# Patient Record
Sex: FEMALE | Race: OTHER | NOT HISPANIC OR LATINO | URBAN - METROPOLITAN AREA
[De-identification: names, ages, dates, MRNs, and addresses within clinical notes are randomized per-mention and may not be internally consistent; named-entity substitution may affect disease eponyms.]

---

## 2024-09-19 PROBLEM — Z41.9 ENCOUNTER FOR PROCEDURE FOR PURPOSES OTHER THAN REMEDYING HEALTH STATE, UNSPECIFIED: Status: ACTIVE | Noted: 2024-09-19

## 2024-10-17 ENCOUNTER — APPOINTMENT (RX ONLY)
Dept: URBAN - METROPOLITAN AREA CLINIC 173 | Facility: CLINIC | Age: 29
Setting detail: DERMATOLOGY
End: 2024-10-17

## 2024-10-17 DIAGNOSIS — Z41.9 ENCOUNTER FOR PROCEDURE FOR PURPOSES OTHER THAN REMEDYING HEALTH STATE, UNSPECIFIED: ICD-10-CM

## 2024-10-17 DIAGNOSIS — B00.1 HERPESVIRAL VESICULAR DERMATITIS: ICD-10-CM

## 2024-10-17 PROCEDURE — ? FILLERS

## 2024-10-17 PROCEDURE — ? VIVACE

## 2024-10-17 PROCEDURE — ? COUNSELING

## 2024-10-17 PROCEDURE — ? PRESCRIPTION

## 2024-10-17 RX ORDER — VALACYCLOVIR HYDROCHLORIDE 500 MG/1
TABLET, FILM COATED ORAL BID
Qty: 10 | Refills: 6 | Status: ERX | COMMUNITY
Start: 2024-10-17

## 2024-10-17 RX ADMIN — VALACYCLOVIR HYDROCHLORIDE: 500 TABLET, FILM COATED ORAL at 00:00

## 2024-10-17 NOTE — PROCEDURE: VIVACE
Consent: Written consent obtained, risks reviewed including but not limited to pain, scarring, infection and incomplete improvement.  Patient understands the procedure is cosmetic in nature and will require out of pocket payment.
Depth In Mm: 0.1
Pain Scale: 0
Treatment Number (Optional): 1
Rf Time In Msec (Optional): 600
Post-Care Instructions: After the procedure, take precautions agains sun exposure. Do not apply sunscreen for 12 hours after the procedure. Do not apply make-up for 12 hours after the procedure. Avoid alcohol based toners for 10-14 days. After 2-3 days patients can return to their regular skin regimen.
Detail Level: Zone
Location #1: lower face
Depth In Mm: 1.5
Price (Use Numbers Only, No Special Characters Or $): 1000
Pre-Procedure Text: After consent was obtained the treatment areas were treated using the above parameters.

## 2024-10-17 NOTE — PROCEDURE: FILLERS
Additional Area 5 Volume In Cc: 0
Include Cannula Information In Note?: No
Additional Area 1 Location: Ear Lobes
Aspiration Statement: Aspiration was performed prior to injecting site with filler.
Lot #: [id number] *MATTHIEU Special*
Consent: Written consent obtained. Risks include but not limited to bruising, beading, irregular texture, ulceration, infection, allergic reaction, scar formation, incomplete augmentation, temporary nature, procedural pain.
Expiration Date (Month Year): 10/23/26
Post-Care Instructions: Patient instructed to apply Alastin post injection serum
Lot #: [id number]
Expiration Date (Month Year): 11/30/2022
Topical Anesthesia?: 23% lidocaine, 7% tetracaine
Additional Area 1 Location: lips
Lot #: [id number]
Detail Level: Detailed
Additional Area 1 Volume In Cc: 0.5
Price (Use Numbers Only, No Special Characters Or $): 593
Filler: RHA 2
Include Cannula Size?: 27G
Map Statment: See Attach Map for Complete Details
Expiration Date (Month Year): 01/31/22
Include Cannula Length?: 1.5 inch

## 2024-11-25 ENCOUNTER — APPOINTMENT (RX ONLY)
Dept: URBAN - METROPOLITAN AREA CLINIC 173 | Facility: CLINIC | Age: 29
Setting detail: DERMATOLOGY
End: 2024-11-25

## 2024-11-25 DIAGNOSIS — Z41.9 ENCOUNTER FOR PROCEDURE FOR PURPOSES OTHER THAN REMEDYING HEALTH STATE, UNSPECIFIED: ICD-10-CM

## 2024-11-25 PROCEDURE — ? VIVACE

## 2024-11-25 NOTE — PROCEDURE: VIVACE
Consent: Written consent obtained, risks reviewed including but not limited to pain, scarring, infection and incomplete improvement.  Patient understands the procedure is cosmetic in nature and will require out of pocket payment.
Depth In Mm: 0.1
Pain Scale: 0
Treatment Number (Optional): 3
Rf Time In Msec (Optional): 600
Post-Care Instructions: After the procedure, take precautions agains sun exposure. Do not apply sunscreen for 12 hours after the procedure. Do not apply make-up for 12 hours after the procedure. Avoid alcohol based toners for 10-14 days. After 2-3 days patients can return to their regular skin regimen.
Detail Level: Zone
Location #1: lower face
Location #2: upper face
Depth In Mm: 1.5
Price (Use Numbers Only, No Special Characters Or $): 1000
Pre-Procedure Text: After consent was obtained the treatment areas were treated using the above parameters.

## 2024-12-30 ENCOUNTER — APPOINTMENT (OUTPATIENT)
Dept: URBAN - METROPOLITAN AREA CLINIC 173 | Facility: CLINIC | Age: 29
Setting detail: DERMATOLOGY
End: 2024-12-30

## 2024-12-30 DIAGNOSIS — Z41.9 ENCOUNTER FOR PROCEDURE FOR PURPOSES OTHER THAN REMEDYING HEALTH STATE, UNSPECIFIED: ICD-10-CM

## 2024-12-30 PROCEDURE — ? BOTOX

## 2024-12-30 NOTE — PROCEDURE: BOTOX
Anterior Platysmal Bands Units: 0
Show Mentalis Units: No
Lot #: [id number]
Additional Area 3 Units: 30
Periorbital Skin Units: 12
Show Topical Anesthesia: Yes
Incrementing Botox Units: By 0.5 Units
Dilution (U/0.1 Cc): 5
Additional Area 2 Location: Lips
Additional Area 2 Units: 4
Additional Area 6 Location: Neck
Expiration Date (Month Year): 03/2027
Detail Level: Detailed
Additional Area 6 Units: 28
Glabellar Complex Units: 11
Price (Use Numbers Only, No Special Characters Or $): 9329
Additional Area 5 Location: Chin
Depressor Anguli Oris Units: 6
Additional Area 1 Location: Jelly Roll
Additional Area 1 Units: 2
Consent: Written consent obtained. Risks include but not limited to lid/brow ptosis, bruising, swelling, diplopia, temporary effect, incomplete chemical denervation.
Post-Care Instructions: Patient instructed to not lie down for 4 hours and limit physical activity for 24 hours. Patient instructed not to travel by airplane for 48 hours.
Additional Area 3 Location: Masseters

## 2025-03-25 ENCOUNTER — APPOINTMENT (OUTPATIENT)
Dept: URBAN - METROPOLITAN AREA CLINIC 173 | Facility: CLINIC | Age: 30
Setting detail: DERMATOLOGY
End: 2025-03-25

## 2025-03-25 ENCOUNTER — RX ONLY (RX ONLY)
Age: 30
End: 2025-03-25

## 2025-03-25 DIAGNOSIS — Z41.9 ENCOUNTER FOR PROCEDURE FOR PURPOSES OTHER THAN REMEDYING HEALTH STATE, UNSPECIFIED: ICD-10-CM

## 2025-03-25 PROCEDURE — ? BOTOX

## 2025-03-25 RX ORDER — HYDROQUINONE 4 %
CREAM (GRAM) TOPICAL
Qty: 30 | Refills: 0 | Status: ERX | COMMUNITY
Start: 2025-03-25

## 2025-03-25 NOTE — PROCEDURE: BOTOX
Anterior Platysmal Bands Units: 0
Show Mentalis Units: No
Lot #: [id number]
Additional Area 3 Units: 30
Periorbital Skin Units: 12
Show Topical Anesthesia: Yes
Incrementing Botox Units: By 0.5 Units
Dilution (U/0.1 Cc): 5
Additional Area 2 Location: Lips
Additional Area 2 Units: 4
Additional Area 6 Location: Neck
Expiration Date (Month Year): 03/2027
Detail Level: Detailed
Additional Area 6 Units: 28
Glabellar Complex Units: 11
Price (Use Numbers Only, No Special Characters Or $): 9250
Additional Area 5 Location: Chin
Depressor Anguli Oris Units: 6
Additional Area 1 Location: Jelly Roll
Additional Area 1 Units: 2
Consent: Written consent obtained. Risks include but not limited to lid/brow ptosis, bruising, swelling, diplopia, temporary effect, incomplete chemical denervation.
Post-Care Instructions: Patient instructed to not lie down for 4 hours and limit physical activity for 24 hours. Patient instructed not to travel by airplane for 48 hours.
Additional Area 3 Location: Masseters

## 2025-05-20 ENCOUNTER — APPOINTMENT (OUTPATIENT)
Dept: URBAN - METROPOLITAN AREA CLINIC 173 | Facility: CLINIC | Age: 30
Setting detail: DERMATOLOGY
End: 2025-05-20

## 2025-05-20 DIAGNOSIS — Z41.9 ENCOUNTER FOR PROCEDURE FOR PURPOSES OTHER THAN REMEDYING HEALTH STATE, UNSPECIFIED: ICD-10-CM

## 2025-05-20 PROCEDURE — ? COSMETIC CONSULTATION: BROWN SPOTS

## 2025-05-20 PROCEDURE — ? VIVACE

## 2025-05-20 NOTE — PROCEDURE: VIVACE
Depth In Mm: 0.1
Fluence (Optional): 245
Rf Time In Msec (Optional): 600
Shots (Optional): 816
Pain Scale: 0
Location #2: lower face, neck
Post-Care Instructions: After the procedure, take precautions agains sun exposure. Do not apply sunscreen for 12 hours after the procedure. Do not apply make-up for 12 hours after the procedure. Avoid alcohol based toners for 10-14 days. After 2-3 days patients can return to their regular skin regimen.
Consent: Written consent obtained, risks reviewed including but not limited to pain, scarring, infection and incomplete improvement.  Patient understands the procedure is cosmetic in nature and will require out of pocket payment.
Frequency (Mhz) (Optional): 1
Detail Level: Zone
Location #1: upper face, eyelids
Depth In Mm: 1.5
Pre-Procedure Text: After consent was obtained the treatment areas were treated using the above parameters.
Price (Use Numbers Only, No Special Characters Or $): 1500
Pain Scale: 4
Depth In Mm: 0.5
Fluence (Optional): 182
Shots (Optional): 601

## 2025-06-13 ENCOUNTER — APPOINTMENT (OUTPATIENT)
Dept: URBAN - METROPOLITAN AREA CLINIC 173 | Facility: CLINIC | Age: 30
Setting detail: DERMATOLOGY
End: 2025-06-13

## 2025-06-13 DIAGNOSIS — Z41.9 ENCOUNTER FOR PROCEDURE FOR PURPOSES OTHER THAN REMEDYING HEALTH STATE, UNSPECIFIED: ICD-10-CM

## 2025-06-13 PROCEDURE — ? CLEAR AND BRILLIANT LASER

## 2025-06-13 PROCEDURE — ? LASER HAIR REMOVAL

## 2025-06-13 ASSESSMENT — LOCATION SIMPLE DESCRIPTION DERM
LOCATION SIMPLE: LEFT FOREARM
LOCATION SIMPLE: RIGHT HAND
LOCATION SIMPLE: LEFT AXILLARY VAULT
LOCATION SIMPLE: RIGHT AXILLARY VAULT
LOCATION SIMPLE: RIGHT UPPER ARM
LOCATION SIMPLE: LEFT HAND
LOCATION SIMPLE: RIGHT POSTERIOR UPPER ARM
LOCATION SIMPLE: RIGHT FOREARM
LOCATION SIMPLE: LEFT UPPER ARM
LOCATION SIMPLE: LEFT POSTERIOR UPPER ARM
LOCATION SIMPLE: LEFT CHEEK

## 2025-06-13 ASSESSMENT — LOCATION DETAILED DESCRIPTION DERM
LOCATION DETAILED: LEFT ANTERIOR DISTAL UPPER ARM
LOCATION DETAILED: LEFT AXILLARY VAULT
LOCATION DETAILED: RIGHT VENTRAL PROXIMAL FOREARM
LOCATION DETAILED: LEFT PROXIMAL POSTERIOR UPPER ARM
LOCATION DETAILED: RIGHT ANTERIOR PROXIMAL UPPER ARM
LOCATION DETAILED: RIGHT RADIAL DORSAL HAND
LOCATION DETAILED: LEFT PROXIMAL DORSAL FOREARM
LOCATION DETAILED: LEFT RADIAL DORSAL HAND
LOCATION DETAILED: RIGHT PROXIMAL DORSAL FOREARM
LOCATION DETAILED: RIGHT AXILLARY VAULT
LOCATION DETAILED: RIGHT PROXIMAL POSTERIOR UPPER ARM
LOCATION DETAILED: LEFT VENTRAL DISTAL FOREARM
LOCATION DETAILED: LEFT MEDIAL MALAR CHEEK

## 2025-06-13 ASSESSMENT — LOCATION ZONE DERM
LOCATION ZONE: FACE
LOCATION ZONE: ARM
LOCATION ZONE: AXILLAE
LOCATION ZONE: HAND

## 2025-06-13 NOTE — PROCEDURE: LASER HAIR REMOVAL
Spot Size: 18 mm
Total Pulses (Settings #3): 439
Number Of Prepaid Treatments (Will Not Render If 0): 0
Location Override: Full arms/hands
External Cooling Fan Speed: 9
Cooling: chill tip
Post-Procedure Care: Immediate endpoint: mild perifollicular erythema and mild edema. Clobetasol and spf applied. Post care reviewed with patient.
Render Post-Care In The Note: No
Fluence (Will Not Render If 0): 11
Topical Anesthesia Type: 30% lidocaine
Price (Use Numbers Only, No Special Characters Or $): 942
Total Pulses: 107
Laser Type: Nd:Yag 1064nm
Fluence (Will Not Render If 0): 20
Detail Level: Zone
Pulse Duration (Include Units): 3
Pulse Duration (Include Units): 30
Fluence (Will Not Render If 0): 14
Length Of Topical Anesthesia Application (Optional): 30 minutes
Shaving (Optional): The patient was shaved in the office prior to the procedure
Consent: Written consent obtained, risks reviewed including but not limited to crusting, scabbing, blistering, scarring, darker or lighter pigmentary change, paradoxical hair regrowth, incomplete removal of hair and infection.
Total Pulses (Settings #4): 601
Treatment Number: 1
Post-Care Instructions: I reviewed with the patient in detail post-care instructions. Patient should avoid sun for a minimum of 4 weeks before and after treatment.
Pre-Procedure: Prior to proceeding the treatment areas were cleaned and all present put on their eye protection.
Total Pulses: 303
Eye Shield Text: Given the treatment area eye shields were inserted prior to treatment.

## 2025-06-13 NOTE — PROCEDURE: CLEAR AND BRILLIANT LASER
Energy Level: Medium
Topical Anesthesia?: 30% lidocaine
Length Of Topical Anesthesia Application (Optional): 45 minutes
Consent: Written consent obtained, risks reviewed including but not limited to crusting, scabbing, blistering, scarring, darker or lighter pigmentary change, incidental hair removal, bruising, and/or incomplete removal.
Post-Care Instructions: I reviewed with the patient in detail post-care instructions. Patient should stay away from the sun and wear sun protection until treated areas are fully healed.
Laser Type: Clear and Brilliant Permea 1927nm
Passes (Will Not Render If 0): 8
Energy Level: High
Detail Level: Zone
Post-Procedure Care: Nectar, Medical Barrier, spf, and cold gauze applied. Post care reviewed with patient.
Price (Use Numbers Only, No Special Characters Or $): 300

## 2025-07-10 ENCOUNTER — APPOINTMENT (OUTPATIENT)
Dept: URBAN - METROPOLITAN AREA CLINIC 173 | Facility: CLINIC | Age: 30
Setting detail: DERMATOLOGY
End: 2025-07-10

## 2025-07-10 DIAGNOSIS — Z41.9 ENCOUNTER FOR PROCEDURE FOR PURPOSES OTHER THAN REMEDYING HEALTH STATE, UNSPECIFIED: ICD-10-CM

## 2025-07-10 PROCEDURE — ? COSMETIC CONSULTATION: FILLERS

## 2025-07-10 PROCEDURE — ? BOTOX

## 2025-07-10 NOTE — HPI: COSMETIC FOLLOW UP
What Condition Are We Treating?: Follow up s/p laser hair removal for bilateral arms, axillae, and hands.
What Procedure Did We Perform At The Last Visit?: Laser hair removal

## 2025-07-10 NOTE — PROCEDURE: BOTOX
Nasal Root Units: 0
Show Right And Left Pupillary Line Units: No
Show Additional Area 4: Yes
Additional Area 2 Units: 6
Glabellar Complex Units: 11
Additional Area 4 Location: Jelly rolls
Expiration Date (Month Year): 6/27
Additional Area 1 Location: lips
Incrementing Botox Units: By 0.5 Units
Additional Area 4 Units: 2
Additional Area 5 Location: Trapezius
Price (Use Numbers Only, No Special Characters Or $): 1397
Additional Area 1 Units: 4
Consent: Written consent obtained. Risks include but not limited to lid/brow ptosis, bruising, swelling, diplopia, temporary effect, incomplete chemical denervation.
Additional Area 5 Units: 60
Periorbital Skin Units: 12
Masseter Units: 15
Post-Care Instructions: Patient instructed to not lie down for 4 hours and limit physical activity for 24 hours. Patient instructed not to travel by airplane for 48 hours.
Additional Area 3 Location: neck
Detail Level: Detailed
Additional Area 3 Units: 28
Lot #: [id number]
Dilution (U/0.1 Cc): 5
Additional Area 2 Location: [facility]

## 2025-07-30 ENCOUNTER — APPOINTMENT (OUTPATIENT)
Dept: URBAN - METROPOLITAN AREA CLINIC 173 | Facility: CLINIC | Age: 30
Setting detail: DERMATOLOGY
End: 2025-07-30

## 2025-07-30 ENCOUNTER — RX ONLY (RX ONLY)
Age: 30
End: 2025-07-30

## 2025-07-30 DIAGNOSIS — Z41.9 ENCOUNTER FOR PROCEDURE FOR PURPOSES OTHER THAN REMEDYING HEALTH STATE, UNSPECIFIED: ICD-10-CM

## 2025-07-30 PROCEDURE — ? CLEAR AND BRILLIANT LASER

## 2025-07-30 PROCEDURE — ? ADDITIONAL NOTES

## 2025-07-30 RX ORDER — BIMATOPROST 0.3 MG/ML
SOLUTION/ DROPS OPHTHALMIC
Qty: 5 | Refills: 4 | Status: ERX | COMMUNITY
Start: 2025-07-30

## 2025-07-30 ASSESSMENT — LOCATION ZONE DERM: LOCATION ZONE: FACE

## 2025-07-30 ASSESSMENT — LOCATION SIMPLE DESCRIPTION DERM: LOCATION SIMPLE: LEFT CHEEK

## 2025-07-30 ASSESSMENT — LOCATION DETAILED DESCRIPTION DERM: LOCATION DETAILED: LEFT INFERIOR CENTRAL MALAR CHEEK

## 2025-08-12 ENCOUNTER — APPOINTMENT (OUTPATIENT)
Dept: URBAN - METROPOLITAN AREA CLINIC 173 | Facility: CLINIC | Age: 30
Setting detail: DERMATOLOGY
End: 2025-08-12

## 2025-08-12 DIAGNOSIS — Z41.9 ENCOUNTER FOR PROCEDURE FOR PURPOSES OTHER THAN REMEDYING HEALTH STATE, UNSPECIFIED: ICD-10-CM

## 2025-08-12 PROCEDURE — ? SCULPTRA
